# Patient Record
Sex: FEMALE | Race: BLACK OR AFRICAN AMERICAN | NOT HISPANIC OR LATINO | Employment: OTHER | URBAN - METROPOLITAN AREA
[De-identification: names, ages, dates, MRNs, and addresses within clinical notes are randomized per-mention and may not be internally consistent; named-entity substitution may affect disease eponyms.]

---

## 2023-08-21 ENCOUNTER — APPOINTMENT (OUTPATIENT)
Dept: LAB | Facility: LAB | Age: 28
End: 2023-08-21
Payer: MEDICAID

## 2023-08-21 LAB
ACTIVATED PARTIAL THROMBOPLASTIN TIME IN PPP BY COAGULATION ASSAY: 31 SEC (ref 27–38)
ALANINE AMINOTRANSFERASE (SGPT) (U/L) IN SER/PLAS: 8 U/L (ref 7–45)
ALBUMIN (G/DL) IN SER/PLAS: 4 G/DL (ref 3.4–5)
ALKALINE PHOSPHATASE (U/L) IN SER/PLAS: 63 U/L (ref 33–110)
ANION GAP IN SER/PLAS: 13 MMOL/L (ref 10–20)
ASPARTATE AMINOTRANSFERASE (SGOT) (U/L) IN SER/PLAS: 17 U/L (ref 9–39)
BILIRUBIN TOTAL (MG/DL) IN SER/PLAS: 0.6 MG/DL (ref 0–1.2)
CALCIUM (MG/DL) IN SER/PLAS: 9.6 MG/DL (ref 8.6–10.6)
CARBON DIOXIDE, TOTAL (MMOL/L) IN SER/PLAS: 31 MMOL/L (ref 21–32)
CHLORIDE (MMOL/L) IN SER/PLAS: 99 MMOL/L (ref 98–107)
CREATININE (MG/DL) IN SER/PLAS: 0.99 MG/DL (ref 0.5–1.05)
ERYTHROCYTE DISTRIBUTION WIDTH (RATIO) BY AUTOMATED COUNT: 13.7 % (ref 11.5–14.5)
ERYTHROCYTE MEAN CORPUSCULAR HEMOGLOBIN CONCENTRATION (G/DL) BY AUTOMATED: 29.8 G/DL (ref 32–36)
ERYTHROCYTE MEAN CORPUSCULAR VOLUME (FL) BY AUTOMATED COUNT: 95 FL (ref 80–100)
ERYTHROCYTES (10*6/UL) IN BLOOD BY AUTOMATED COUNT: 4.8 X10E12/L (ref 4–5.2)
GFR FEMALE: 79 ML/MIN/1.73M2
GLUCOSE (MG/DL) IN SER/PLAS: 83 MG/DL (ref 74–99)
HEMATOCRIT (%) IN BLOOD BY AUTOMATED COUNT: 45.6 % (ref 36–46)
HEMOGLOBIN (G/DL) IN BLOOD: 13.6 G/DL (ref 12–16)
INR IN PPP BY COAGULATION ASSAY: 1.2 (ref 0.9–1.1)
LEUKOCYTES (10*3/UL) IN BLOOD BY AUTOMATED COUNT: 5.7 X10E9/L (ref 4.4–11.3)
NRBC (PER 100 WBCS) BY AUTOMATED COUNT: 0 /100 WBC (ref 0–0)
PLATELETS (10*3/UL) IN BLOOD AUTOMATED COUNT: 291 X10E9/L (ref 150–450)
POTASSIUM (MMOL/L) IN SER/PLAS: 4.4 MMOL/L (ref 3.5–5.3)
PROTEIN TOTAL: 7.8 G/DL (ref 6.4–8.2)
PROTHROMBIN TIME (PT) IN PPP BY COAGULATION ASSAY: 13.8 SEC (ref 9.8–12.8)
SODIUM (MMOL/L) IN SER/PLAS: 139 MMOL/L (ref 136–145)
UREA NITROGEN (MG/DL) IN SER/PLAS: 12 MG/DL (ref 6–23)

## 2023-08-22 ENCOUNTER — HOSPITAL ENCOUNTER (OUTPATIENT)
Dept: DATA CONVERSION | Facility: HOSPITAL | Age: 28
Discharge: HOME | End: 2023-08-22
Attending: UROLOGY | Admitting: UROLOGY

## 2023-08-22 DIAGNOSIS — Z53.29 PROCEDURE AND TREATMENT NOT CARRIED OUT BECAUSE OF PATIENT'S DECISION FOR OTHER REASONS: ICD-10-CM

## 2023-08-22 DIAGNOSIS — N20.0 CALCULUS OF KIDNEY: ICD-10-CM

## 2023-09-29 VITALS — HEIGHT: 57 IN | BODY MASS INDEX: 21.12 KG/M2 | WEIGHT: 97.88 LBS

## 2023-09-30 NOTE — H&P
History & Physical Reviewed:   Pregnant/Lactating:  ·  Are You Pregnant no   ·  Are You Currently Breastfeeding no     I have reviewed the History and Physical dated:  22-Aug-2023   History and Physical reviewed and relevant findings noted. Patient examined to review pertinent physical  findings.: Significant findings noted below   Findings: Patient had previously agreed to proceed  with percutaneous nephrolithotomy under general anesthesia, however today patient expressed severe anxieties relating to intubation at [facility] given that he has had some traumatic experiences in the past.  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  After  thorough discussion of the consequences of this decision, patient was okay with canceling surgery.  They will reach out to her urologist today to schedule outpatient appointment   Home Medications Reviewed: no changes noted   Allergies Reviewed: no changes noted       ERAS (Enhanced Recovery After Surgery):  ·  ERAS Patient: no     Consent:   COVID-19 Consent:  ·  COVID-19 Risk Consent Surgeon has reviewed key risks related to the risk of felipe COVID-19 and if they contract COVID-19 what the risks are.     Attestation:   Note Completion:  I am a:  Resident/Fellow   Attending Attestation I saw and evaluated the patient.  I personally obtained the key and critical portions of the history and physical exam or was physically present for key and  critical portions performed by the resident/fellow. I reviewed the resident/fellow?s documentation and discussed the patient with the resident/fellow.  I agree with the resident/fellow?s medical decision making as documented in the note.     I personally evaluated the patient on 22-Aug-2023  Patient understands condition, prognosis and need for follow up care.         Electronic Signatures:  Lc Orona (Resident))  (Signed 22-Aug-2023 07:19)   Authored: History & Physical Reviewed, ERAS, Consent,  Note Completion  Micah Lopez)  (Signed 23-Aug-2023 12:34)   Authored: History & Physical Reviewed, Note Completion   Co-Signer: History & Physical Reviewed, ERAS, Consent, Note Completion      Last Updated: 23-Aug-2023 12:34 by Micah Lopez)

## 2023-10-21 PROBLEM — R00.1 BRADYCARDIA: Status: ACTIVE | Noted: 2023-10-21

## 2023-10-21 PROBLEM — M94.0 COSTOCHONDRITIS: Status: ACTIVE | Noted: 2023-10-21

## 2023-10-21 PROBLEM — Q26.1 LSVC (PERSISTENT LEFT SUPERIOR VENA CAVA) (HHS-HCC): Chronic | Status: ACTIVE | Noted: 2017-10-10

## 2023-10-21 PROBLEM — Z99.81 DEPENDENCE ON SUPPLEMENTAL OXYGEN: Status: ACTIVE | Noted: 2023-07-01

## 2023-10-21 PROBLEM — G89.29 CHRONIC PAIN OF BOTH KNEES: Status: ACTIVE | Noted: 2023-10-21

## 2023-10-21 PROBLEM — N13.729: Status: ACTIVE | Noted: 2018-07-18

## 2023-10-21 PROBLEM — N20.1 CALCULUS OF URETER: Status: ACTIVE | Noted: 2018-07-18

## 2023-10-21 PROBLEM — R53.83 MALAISE AND FATIGUE: Status: ACTIVE | Noted: 2021-03-30

## 2023-10-21 PROBLEM — Z86.14 PERSONAL HISTORY OF MRSA (METHICILLIN RESISTANT STAPHYLOCOCCUS AUREUS): Status: ACTIVE | Noted: 2023-10-21

## 2023-10-21 PROBLEM — M25.562 CHRONIC PAIN OF BOTH KNEES: Status: ACTIVE | Noted: 2023-10-21

## 2023-10-21 PROBLEM — R53.81 MALAISE AND FATIGUE: Status: ACTIVE | Noted: 2021-03-30

## 2023-10-21 PROBLEM — M41.9 SCOLIOSIS: Status: ACTIVE | Noted: 2023-10-21

## 2023-10-21 PROBLEM — R00.0 TACHYCARDIA: Status: ACTIVE | Noted: 2023-10-21

## 2023-10-21 PROBLEM — N13.30 HYDRONEPHROSIS: Status: ACTIVE | Noted: 2021-06-27

## 2023-10-21 PROBLEM — I27.20 PULMONARY HYPERTENSION (MULTI): Status: ACTIVE | Noted: 2023-10-21

## 2023-10-21 PROBLEM — I87.1: Chronic | Status: ACTIVE | Noted: 2021-02-09

## 2023-10-21 PROBLEM — N30.00 ACUTE CYSTITIS WITHOUT HEMATURIA: Status: ACTIVE | Noted: 2017-07-24

## 2023-10-21 PROBLEM — R20.8 DYSESTHESIA: Status: ACTIVE | Noted: 2023-10-21

## 2023-10-21 PROBLEM — F12.10 CANNABIS USE DISORDER, MILD, ABUSE: Status: ACTIVE | Noted: 2021-03-30

## 2023-10-21 PROBLEM — M25.561 CHRONIC PAIN OF BOTH KNEES: Status: ACTIVE | Noted: 2023-10-21

## 2023-10-21 PROBLEM — Q87.2: Status: ACTIVE | Noted: 2017-10-10

## 2023-10-21 PROBLEM — N63.0 LUMP, BREAST: Status: ACTIVE | Noted: 2023-10-21

## 2023-10-21 PROBLEM — N12 PYELONEPHRITIS: Status: ACTIVE | Noted: 2017-08-05

## 2023-10-21 PROBLEM — T42.6X5A: Status: ACTIVE | Noted: 2023-10-21

## 2023-10-21 PROBLEM — G89.29 CHRONIC LOW BACK PAIN: Status: ACTIVE | Noted: 2017-07-24

## 2023-10-21 PROBLEM — Q20.3 TRANSPOSITION OF GREAT ARTERIES (HHS-HCC): Status: ACTIVE | Noted: 2023-10-21

## 2023-10-21 PROBLEM — Q24.8: Status: ACTIVE | Noted: 2023-10-21

## 2023-10-21 PROBLEM — I44.30 AV HEART BLOCK: Chronic | Status: ACTIVE | Noted: 2018-07-18

## 2023-10-21 PROBLEM — R11.0 NAUSEA: Status: ACTIVE | Noted: 2023-10-21

## 2023-10-21 PROBLEM — E78.6 LOW HDL (UNDER 40): Status: ACTIVE | Noted: 2023-10-21

## 2023-10-21 PROBLEM — I15.9 SECONDARY HYPERTENSION: Status: ACTIVE | Noted: 2023-02-28

## 2023-10-21 PROBLEM — Q62.11 HYDRONEPHROSIS WITH URETEROPELVIC JUNCTION (UPJ) OBSTRUCTION: Status: ACTIVE | Noted: 2018-07-18

## 2023-10-21 PROBLEM — Q24.9 CONGENITAL ANOMALY OF HEART (HHS-HCC): Status: ACTIVE | Noted: 2018-07-18

## 2023-10-21 PROBLEM — Z98.890 S/P ARTERIAL SWITCH OPERATION: Chronic | Status: ACTIVE | Noted: 2017-10-10

## 2023-10-21 PROBLEM — Z04.9 CONDITION NOT FOUND: Status: ACTIVE | Noted: 2023-10-21

## 2023-10-21 PROBLEM — I51.89 DIASTOLIC DYSFUNCTION: Status: ACTIVE | Noted: 2023-10-21

## 2023-10-21 PROBLEM — Q76.1 KLIPPEL-FEIL SYNDROME: Status: ACTIVE | Noted: 2023-07-01

## 2023-10-21 PROBLEM — M79.18 CHRONIC MUSCULOSKELETAL PAIN: Status: ACTIVE | Noted: 2023-10-21

## 2023-10-21 PROBLEM — R94.6 ABNORMAL THYROID FUNCTION TEST: Status: ACTIVE | Noted: 2023-10-21

## 2023-10-21 PROBLEM — F41.1 GENERALIZED ANXIETY DISORDER: Status: ACTIVE | Noted: 2021-03-30

## 2023-10-21 PROBLEM — G47.33 OSA (OBSTRUCTIVE SLEEP APNEA): Status: ACTIVE | Noted: 2023-07-01

## 2023-10-21 PROBLEM — N91.2 AMENORRHEA: Status: ACTIVE | Noted: 2023-10-21

## 2023-10-21 PROBLEM — Q26.3 PARTIAL ANOMALOUS PULMONARY VENOUS RETURN (PAPVR) (HHS-HCC): Status: ACTIVE | Noted: 2017-10-10

## 2023-10-21 PROBLEM — I95.0 IDIOPATHIC HYPOTENSION: Status: ACTIVE | Noted: 2023-10-21

## 2023-10-21 PROBLEM — G89.29 CHRONIC MUSCULOSKELETAL PAIN: Status: ACTIVE | Noted: 2023-10-21

## 2023-10-21 PROBLEM — M46.1 SACROILIITIS (CMS-HCC): Status: ACTIVE | Noted: 2023-10-21

## 2023-10-21 PROBLEM — N20.0 CALCULUS OF BOTH KIDNEYS: Status: ACTIVE | Noted: 2023-07-01

## 2023-10-21 PROBLEM — R06.02 SHORTNESS OF BREATH: Status: ACTIVE | Noted: 2023-10-21

## 2023-10-21 PROBLEM — K59.00 OBSTIPATION: Status: ACTIVE | Noted: 2023-10-21

## 2023-10-21 PROBLEM — N32.3 BLADDER DIVERTICULUM: Status: ACTIVE | Noted: 2023-10-21

## 2023-10-21 PROBLEM — Z77.29 CARBON MONOXIDE EXPOSURE: Status: ACTIVE | Noted: 2023-10-21

## 2023-10-21 PROBLEM — R52 UNCONTROLLED PAIN: Status: ACTIVE | Noted: 2020-07-29

## 2023-10-21 PROBLEM — F54 PSYCHOLOGICAL FACTORS AFFECTING MEDICAL CONDITION: Status: ACTIVE | Noted: 2021-03-30

## 2023-10-21 PROBLEM — G89.29 CHRONIC PAIN: Status: ACTIVE | Noted: 2021-03-30

## 2023-10-21 PROBLEM — M54.50 CHRONIC LOW BACK PAIN: Status: ACTIVE | Noted: 2017-07-24

## 2023-10-21 PROBLEM — R31.9 HEMATURIA: Status: ACTIVE | Noted: 2023-10-21

## 2023-10-21 RX ORDER — AMLODIPINE BESYLATE 5 MG/1
1 TABLET ORAL
COMMUNITY
Start: 2023-02-27

## 2023-10-21 RX ORDER — LIDOCAINE AND PRILOCAINE 25; 25 MG/G; MG/G
CREAM TOPICAL
COMMUNITY
Start: 2020-05-11

## 2023-10-21 RX ORDER — TESTOSTERONE ENANTHATE 200 MG/ML
VIAL (ML) INTRAMUSCULAR
COMMUNITY
Start: 2022-05-12

## 2023-10-21 RX ORDER — OXYBUTYNIN CHLORIDE 10 MG/1
1 TABLET, EXTENDED RELEASE ORAL DAILY
COMMUNITY
Start: 2021-06-15

## 2023-10-21 RX ORDER — SULFAMETHOXAZOLE AND TRIMETHOPRIM 800; 160 MG/1; MG/1
1 TABLET ORAL
COMMUNITY
Start: 2020-03-09

## 2023-10-21 RX ORDER — OXYCODONE HYDROCHLORIDE 5 MG/1
5 TABLET ORAL EVERY 6 HOURS PRN
COMMUNITY

## 2023-10-21 RX ORDER — FEXOFENADINE HCL 30 MG/5 ML
SUSPENSION, ORAL (FINAL DOSE FORM) ORAL
COMMUNITY
Start: 2020-05-11

## 2023-10-21 RX ORDER — TRAMADOL HYDROCHLORIDE 50 MG/1
50 TABLET ORAL EVERY 12 HOURS
COMMUNITY
Start: 2023-04-20

## 2023-10-21 RX ORDER — GABAPENTIN 100 MG/1
1 CAPSULE ORAL 3 TIMES DAILY
COMMUNITY
Start: 2020-02-26

## 2023-10-21 RX ORDER — SODIUM CHLORIDE 0.9 % (FLUSH) 0.9 %
10 SYRINGE (ML) INJECTION DAILY
COMMUNITY
Start: 2023-07-07

## 2023-10-21 RX ORDER — ALBUTEROL SULFATE 90 UG/1
2 AEROSOL, METERED RESPIRATORY (INHALATION) EVERY 6 HOURS PRN
COMMUNITY
Start: 2020-05-11

## 2023-10-21 RX ORDER — HYDROMORPHONE HYDROCHLORIDE 4 MG/1
TABLET ORAL
COMMUNITY
Start: 2023-07-07 | End: 2023-10-21 | Stop reason: SDUPTHER

## 2023-10-21 RX ORDER — SERTRALINE HYDROCHLORIDE 50 MG/1
1 TABLET, FILM COATED ORAL DAILY
COMMUNITY
Start: 2022-05-04

## 2023-10-21 RX ORDER — OXYCODONE AND ACETAMINOPHEN 5; 325 MG/1; MG/1
TABLET ORAL EVERY 6 HOURS PRN
COMMUNITY

## 2023-10-21 RX ORDER — PHENAZOPYRIDINE HYDROCHLORIDE 200 MG/1
1 TABLET, FILM COATED ORAL
COMMUNITY
Start: 2022-09-21

## 2023-10-21 RX ORDER — ALFUZOSIN HYDROCHLORIDE 10 MG/1
1 TABLET, EXTENDED RELEASE ORAL
COMMUNITY
Start: 2021-07-26

## 2023-10-21 RX ORDER — SYRINGE, DISPOSABLE, 1 ML
SYRINGE, EMPTY DISPOSABLE MISCELLANEOUS
COMMUNITY
Start: 2023-01-07

## 2023-10-21 RX ORDER — NEEDLES, DISPOSABLE 25GX5/8"
NEEDLE, DISPOSABLE MISCELLANEOUS
COMMUNITY
Start: 2023-01-07

## 2023-10-21 RX ORDER — CYCLOBENZAPRINE HCL 10 MG
10 TABLET ORAL 2 TIMES DAILY PRN
COMMUNITY
Start: 2020-03-04

## 2023-10-21 RX ORDER — CEFPODOXIME PROXETIL 100 MG/1
1 TABLET, FILM COATED ORAL 2 TIMES DAILY
COMMUNITY
Start: 2022-09-03

## 2023-10-21 RX ORDER — TESTOSTERONE 50 MG/5G
GEL TRANSDERMAL
COMMUNITY
Start: 2021-08-16

## 2023-10-21 RX ORDER — NEEDLES, SAFETY 18GX1 1/2"
NEEDLE, DISPOSABLE MISCELLANEOUS
COMMUNITY
Start: 2023-01-10

## 2023-10-21 RX ORDER — ONDANSETRON 4 MG/1
4 TABLET, FILM COATED ORAL 3 TIMES DAILY PRN
COMMUNITY
Start: 2019-07-08

## 2023-10-21 RX ORDER — CLINDAMYCIN PHOSPHATE 10 UG/ML
LOTION TOPICAL 2 TIMES DAILY
COMMUNITY
Start: 2019-10-21

## 2023-10-21 RX ORDER — TESTOSTERONE CYPIONATE, ALCOHOL 200 MG/ML
25 KIT INTRAMUSCULAR
COMMUNITY
Start: 2023-07-07

## 2023-10-21 RX ORDER — ONDANSETRON 4 MG/1
TABLET, ORALLY DISINTEGRATING ORAL
COMMUNITY
Start: 2022-10-05

## 2023-10-21 RX ORDER — IPRATROPIUM BROMIDE AND ALBUTEROL SULFATE 2.5; .5 MG/3ML; MG/3ML
SOLUTION RESPIRATORY (INHALATION) EVERY 4 HOURS PRN
COMMUNITY
Start: 2020-05-11

## 2023-10-21 RX ORDER — CEFEPIME HYDROCHLORIDE 2 G/1
INJECTION, POWDER, FOR SOLUTION INTRAVENOUS
COMMUNITY
Start: 2023-07-06